# Patient Record
Sex: FEMALE | Employment: FULL TIME | URBAN - METROPOLITAN AREA
[De-identification: names, ages, dates, MRNs, and addresses within clinical notes are randomized per-mention and may not be internally consistent; named-entity substitution may affect disease eponyms.]

---

## 2021-03-01 ENCOUNTER — THERAPY VISIT (OUTPATIENT)
Dept: PHYSICAL THERAPY | Facility: CLINIC | Age: 61
End: 2021-03-01
Payer: COMMERCIAL

## 2021-03-01 DIAGNOSIS — M54.50 ACUTE MIDLINE LOW BACK PAIN: ICD-10-CM

## 2021-03-01 PROCEDURE — 97161 PT EVAL LOW COMPLEX 20 MIN: CPT | Mod: GP | Performed by: PHYSICAL THERAPIST

## 2021-03-01 PROCEDURE — 97110 THERAPEUTIC EXERCISES: CPT | Mod: GP | Performed by: PHYSICAL THERAPIST

## 2021-03-01 NOTE — LETTER
[facility]  [address]  [facility] [address]  [phone]    2021    Re: Mandy M Mariya   :   1960  MRN:  [id number]   REFERRING PHYSICIAN:   Kostas Warren    [facility]    Date of Initial Evaluation: 2021  Visits:  Rxs Used: 1  Reason for Referral:  Acute midline low back pain    EVALUATION SUMMARY    [facility]  Subjective:  The history is provided by the patient. No  was used.   Therapist Generated HPI Evaluation  Problem details: Pt reports about two months ago (early 2021) lower back pain and numbness in mid back. Denies any event. Saw MD and referred to PT.  .         Type of problem:  Lumbar.  This is a new condition.  Condition occurred with:  Insidious onset.  Where condition occurred: at home.  Patient reports pain:  Lumbar spine left, lumbar spine right and central lumbar spine.  Pain is described as other (uncomfortable)   Pain radiates to:  No radiation. Pain is the same all the time.  Since onset symptoms are unchanged.  Associated symptoms:  Numbness (numbness between scapulas). Symptoms are exacerbated by standing and lifting  and relieved by other and activity/movement (lying on R side).  Restrictions due to condition include:  Working in normal job without restrictions.  Barriers include:  None as reported by patient.    Patient Health History  Pain is reported as 5/10 on pain scale.  General health as reported by patient is excellent.  Pertinent medical history includes: smoking.   Red flags:  None as reported by patient.  Medical allergies: none.   Surgeries include:  None.    Current medications:  None.    Current occupation is .   Primary job tasks include:  Computer work, lifting/carrying and prolonged standing.         Re: Mandy Meraz   :    1960        Objective:     Lumbar/SI Evaluation  Lumbar Myotomes:  normal    Munir Cervical Evaluation  Posture:  Sitting: fair  Movement Loss:  Flexion (Flex): nil  Retraction (RET): min  Extension (EXT): min  Lateral Flexion Right (LF R): nil  Lateral Flexion Left (LF L): nil  Rotation Right (ROT R): nil  Rotation Left (ROT L): nil    Munir Lumbar Evaluation  Posture:  Lordosis: Reduced  Movement Loss:  Flexion (Flex): nil and pain  Extension (EXT): min, mod and pain  Side Kramer R (SG R): nil  Side Glide L (SG L): nil  Test Movements:  EIL: During: no effect  After: no effect  Mechanical Response: no effect  Repeat EIL: During: no effect  After: no effect  Mechanical Response: IncROM  Conclusion: derangement  Principle of Treatment:  Posture Correction: lumbar support  Extension: EIL 10-15 reps every 2-3 hrs    Munir Thoracic Evalution:  Movement Loss:  Extension (EXT): nil    Assessment/Plan:    Patient is a 61 year old female with lumbar complaints.    Patient has the following significant findings with corresponding treatment plan.                Diagnosis 1:  Central symmetrical lumbar derangemnt  Pain -  manual therapy, self management, education, directional preference exercise and home program  Decreased ROM/flexibility - manual therapy, therapeutic exercise, therapeutic activity and home program  Inflammation - self management/home program  Impaired muscle performance - neuro re-education and home program  Decreased function - therapeutic activities and home program  Impaired posture - neuro re-education.    Re: Mandy Meraz   :   1960          1) Decision-Making    Low complexity using standardized patient assessment instrument and/or measureable assessment of functional outcome.  Cumulative Therapy Evaluation is: Low complexity.  Previous and current functional limitations:  (See Goal Flow Sheet for this information)    Short term and Long term goals: (See Goal Flow Sheet for  this information)   Communication ability:  Patient appears to be able to clearly communicate and understand verbal and written communication and follow directions correctly.  Treatment Explanation - The following has been discussed with the patient:   RX ordered/plan of care  Anticipated outcomes  Possible risks and side effects  This patient would benefit from PT intervention to resume normal activities.   Rehab potential is excellent.    Frequency:  1 X week, once daily  Duration:  for 6 weeks  Discharge Plan:  Achieve all LTG.  Independent in home treatment program.  Reach maximal therapeutic benefit.    Thank you for your referral.      INQUIRIES  Therapist: Jose Ramos DPT   [facility] PHYSICAL THERAP[facility]  [address] [place] [address]. [id number]  [facility] [address]  Phone: [phone]  Fax: [phone]

## 2021-03-01 NOTE — PROGRESS NOTES
[facility] Initial Evaluation  Subjective:  The history is provided by the patient. No  was used.   Therapist Generated HPI Evaluation  Problem details: Pt reports about two months ago (early Jan 2021) lower back pain and numbness in mid back. Denies any event. Saw MD and referred to PT.  .         Type of problem:  Lumbar.    This is a new condition.  Condition occurred with:  Insidious onset.  Where condition occurred: at home.  Patient reports pain:  Lumbar spine left, lumbar spine right and central lumbar spine.  Pain is described as other (uncomfortable)   Pain radiates to:  No radiation. Pain is the same all the time.  Since onset symptoms are unchanged.  Associated symptoms:  Numbness (numbness between scapulas). Symptoms are exacerbated by standing and lifting  and relieved by other and activity/movement (lying on R side).      Restrictions due to condition include:  Working in normal job without restrictions.  Barriers include:  None as reported by patient.    Patient Health History         Pain is reported as 5/10 on pain scale.  General health as reported by patient is excellent.  Pertinent medical history includes: smoking.   Red flags:  None as reported by patient.  Medical allergies: none.   Surgeries include:  None.    Current medications:  None.    Current occupation is .   Primary job tasks include:  Computer work, lifting/carrying and prolonged standing.                                    Objective:  System         Lumbar/SI Evaluation    Lumbar Myotomes:  normal                                                                         Munir Cervical Evaluation    Posture:  Sitting: fair            Movement Loss:    Flexion (Flex): nil  Retraction (RET): min  Extension (EXT): min  Lateral Flexion Right (LF R): nil  Lateral Flexion Left (LF L): nil  Rotation Right (ROT R): nil  Rotation Left (ROT L): nil          Munir Lumbar  Evaluation    Posture:      Lordosis: Reduced        Movement Loss:  Flexion (Flex): nil and pain  Extension (EXT): min, mod and pain  Side Sod R (SG R): nil  Side Glide L (SG L): nil  Test Movements:        EIL: During: no effect  After: no effect  Mechanical Response: no effect  Repeat EIL: During: no effect  After: no effect  Mechanical Response: IncROM        Conclusion: derangement  Principle of Treatment:  Posture Correction: lumbar support    Extension: EIL 10-15 reps every 2-3 hrs                         Munir Thoracic Evalution:    Movement Loss:    Extension (EXT): nil                                ROS    Assessment/Plan:    Patient is a 61 year old female with lumbar complaints.    Patient has the following significant findings with corresponding treatment plan.                Diagnosis 1:  Central symmetrical lumbar derangemnt  Pain -  manual therapy, self management, education, directional preference exercise and home program  Decreased ROM/flexibility - manual therapy, therapeutic exercise, therapeutic activity and home program  Inflammation - self management/home program  Impaired muscle performance - neuro re-education and home program  Decreased function - therapeutic activities and home program  Impaired posture - neuro re-education    .  1) Decision-Making    Low complexity using standardized patient assessment instrument and/or measureable assessment of functional outcome.  Cumulative Therapy Evaluation is: Low complexity.    Previous and current functional limitations:  (See Goal Flow Sheet for this information)    Short term and Long term goals: (See Goal Flow Sheet for this information)     Communication ability:  Patient appears to be able to clearly communicate and understand verbal and written communication and follow directions correctly.  Treatment Explanation - The following has been discussed with the patient:   RX ordered/plan of care  Anticipated outcomes  Possible risks and  side effects  This patient would benefit from PT intervention to resume normal activities.   Rehab potential is excellent.    Frequency:  1 X week, once daily  Duration:  for 6 weeks  Discharge Plan:  Achieve all LTG.  Independent in home treatment program.  Reach maximal therapeutic benefit.    Please refer to the daily flowsheet for treatment today, total treatment time and time spent performing 1:1 timed codes.

## 2021-05-06 PROBLEM — M54.50 ACUTE MIDLINE LOW BACK PAIN: Status: RESOLVED | Noted: 2021-03-01 | Resolved: 2021-05-06
